# Patient Record
Sex: FEMALE | Race: BLACK OR AFRICAN AMERICAN | NOT HISPANIC OR LATINO | URBAN - METROPOLITAN AREA
[De-identification: names, ages, dates, MRNs, and addresses within clinical notes are randomized per-mention and may not be internally consistent; named-entity substitution may affect disease eponyms.]

---

## 2018-03-17 ENCOUNTER — EMERGENCY (EMERGENCY)
Facility: HOSPITAL | Age: 63
LOS: 1 days | Discharge: HOME | End: 2018-03-17
Attending: EMERGENCY MEDICINE | Admitting: EMERGENCY MEDICINE
Payer: OTHER MISCELLANEOUS

## 2018-03-17 VITALS
TEMPERATURE: 98 F | HEART RATE: 67 BPM | RESPIRATION RATE: 18 BRPM | DIASTOLIC BLOOD PRESSURE: 80 MMHG | OXYGEN SATURATION: 97 % | SYSTOLIC BLOOD PRESSURE: 173 MMHG

## 2018-03-17 DIAGNOSIS — Z90.49 ACQUIRED ABSENCE OF OTHER SPECIFIED PARTS OF DIGESTIVE TRACT: Chronic | ICD-10-CM

## 2018-03-17 PROCEDURE — 99283 EMERGENCY DEPT VISIT LOW MDM: CPT | Mod: 25

## 2018-03-17 PROCEDURE — 20610 DRAIN/INJ JOINT/BURSA W/O US: CPT | Mod: RT

## 2018-03-17 NOTE — ED ADULT TRIAGE NOTE - CHIEF COMPLAINT QUOTE
Pt states she bent over this morning, and when she came back up she felt something clicking in her right knee and had difficulty bearing weight, +pain. PMH of HTN

## 2018-03-17 NOTE — ED PROVIDER NOTE - OBJECTIVE STATEMENT
61 y/o female, with PMHx of HTN and heart condition, presents to the ED for right knee pain and swelling x today 11AM. Pt reports bending down and hearing a sound from the right knee as the was getting up, with onset of pain since. Has not taken medication for the pain. Denies numbness, tingling, weakness, and any other complaints. Pt on ASA and plavix for heart condition. NKDA. No smoking.

## 2018-03-17 NOTE — ED PROVIDER NOTE - MEDICAL DECISION MAKING DETAILS
Pt with significant swelling after bending over/twisting. Concern for meniscus injury. Arthrocentesis Concern hemarthrosis given pt is Plavix and ASA.

## 2018-03-17 NOTE — ED PROVIDER NOTE - LOWER EXTREMITY EXAM, RIGHT
Right knee with swelling, suprapatellar effusion. TTP. No warmth. No erythema limited ROM. Negative drawer test. Negative valgus stress test. Sensation intact.

## 2018-03-17 NOTE — ED PROCEDURE NOTE - CPROC ED TIME OUT STATEMENT1
“Patient's name, , procedure and correct site were confirmed during the [facility] Timeout.”
“Patient's name, , procedure and correct site were confirmed during the [facility] Timeout.”

## 2019-03-09 NOTE — ED PROCEDURE NOTE - NS_EDPROVIDERDISPOUSERTYPE_ED_A_ED
Scribe Attestation (For Scribes USE Only)... Scribe Attestation (For Scribes USE Only).../Attending Attestation (For Attendings USE Only)... I am a non participating [employer] physician seeing Pt in coverage for Dr Barraza I am a non participating [facility]  physician seeing Pt in coverage for Dr Barraza. The above patient examination was reviewed with Dr. Awan and I agree with his evaluation, assessment and treatment plan.  Orlin Barraza M.D.